# Patient Record
Sex: MALE | Race: WHITE | URBAN - METROPOLITAN AREA
[De-identification: names, ages, dates, MRNs, and addresses within clinical notes are randomized per-mention and may not be internally consistent; named-entity substitution may affect disease eponyms.]

---

## 2017-10-24 ENCOUNTER — OFFICE VISIT (OUTPATIENT)
Dept: ORTHOPEDIC SURGERY | Age: 22
End: 2017-10-24

## 2017-10-24 ENCOUNTER — TELEPHONE (OUTPATIENT)
Dept: ORTHOPEDIC SURGERY | Age: 22
End: 2017-10-24

## 2017-10-24 VITALS
RESPIRATION RATE: 17 BRPM | SYSTOLIC BLOOD PRESSURE: 140 MMHG | HEIGHT: 71 IN | BODY MASS INDEX: 32.2 KG/M2 | WEIGHT: 230 LBS | HEART RATE: 64 BPM | DIASTOLIC BLOOD PRESSURE: 92 MMHG

## 2017-10-24 DIAGNOSIS — M25.512 ACUTE PAIN OF LEFT SHOULDER: Primary | ICD-10-CM

## 2017-10-24 PROCEDURE — 99203 OFFICE O/P NEW LOW 30 MIN: CPT | Performed by: ORTHOPAEDIC SURGERY

## 2017-10-24 NOTE — TELEPHONE ENCOUNTER
MRI scheduled for 10/31/17 @1:30pm.  [facility], no prior [place] [facility] is needed.   Tried to call patient, voicemail not set up and he did not answer

## 2017-10-24 NOTE — PROGRESS NOTES
tab.      Physical Examination:      Vital signs:  BP (!) 140/92   Pulse 64   Resp 17   Ht 5' 11\" (1.803 m)   Wt 230 lb (104.3 kg)   BMI 32.08 kg/m²     General:   alert, appears stated age, cooperative and no distress   Left Shoulder   Active ROM:   forward flexion 180, external rotation 80, internal rotation L4. Bilateral shoulders   Passive ROM:  forward flexion 180   Joint Tenderness:   none   Neer:   positive   Irizarry:   positive   Strength:   5/5 Supraspinatus, External rotation, Internal rotation    Bilateral shoulders   Drop-arm test:   negative   Belly-press test:   negative   Bear-hug test:   negative   Speed's test:   positive   Bicipital groove tenderness:  positive   Elmore's test:   positive   Cross-body adduction test:   negative    AC joint tenderness:   negative     There are no skin lesions, cellulitis, or extreme edema in the upper extremities. Sensation is grossly intact to light touch bilaterally upper extremity. The patient has warm and well-perfused Bilateral upper extremities with brisk capillary refill. Imaging   Left Shoulder X-Ray: 3 view x-rays of the shoulder including AP, scapular Y, and axillary obtained and reviewed  AC Joint: no abnormalities noted  Glenohumeral joint: no abnormalities noted  Elevation humeral head: absent    Left Shoulder MRI: ordered, but not yet obtained      Assessment:      Left shoulder pain, possible SLAP tear      Plan:      Natural history and expected course discussed. Questions answered. He has tried to play through the pain the last several weeks. He has already done RTC strengthening and periscapular strengthening in the training room. Will obtain MR arthrogram of left shoulder to evaluate labrum. Playing status: out (he was pulled from last game by  in second half). Follow up after MRI.

## 2017-10-31 ENCOUNTER — HOSPITAL ENCOUNTER (OUTPATIENT)
Dept: MRI IMAGING | Age: 22
Discharge: HOME | End: 2017-10-31
Attending: ORTHOPAEDIC SURGERY | Admitting: ORTHOPAEDIC SURGERY

## 2017-10-31 DIAGNOSIS — M25.512 ACUTE PAIN OF LEFT SHOULDER: ICD-10-CM

## 2017-11-07 ENCOUNTER — OFFICE VISIT (OUTPATIENT)
Dept: ORTHOPEDIC SURGERY | Age: 22
End: 2017-11-07

## 2017-11-07 VITALS
BODY MASS INDEX: 32.19 KG/M2 | DIASTOLIC BLOOD PRESSURE: 81 MMHG | HEART RATE: 45 BPM | RESPIRATION RATE: 16 BRPM | HEIGHT: 71 IN | SYSTOLIC BLOOD PRESSURE: 128 MMHG | WEIGHT: 229.94 LBS

## 2017-11-07 DIAGNOSIS — S43.432A TEAR OF LEFT GLENOID LABRUM, INITIAL ENCOUNTER: Primary | ICD-10-CM

## 2017-11-07 PROCEDURE — 99214 OFFICE O/P EST MOD 30 MIN: CPT | Performed by: ORTHOPAEDIC SURGERY

## 2017-11-07 NOTE — PROGRESS NOTES
tenderness:   negative     There are no skin lesions, cellulitis, or extreme edema in the upper extremities. Sensation is grossly intact to light touch bilaterally upper extremity. The patient has warm and well-perfused Bilateral upper extremities with brisk capillary refill. Imaging   Left Shoulder MRI: I independently reviewed the images, as well as the radiology report. Posterior labral tear with subtle elevation of the periosteum, most   compatible with POLPSA lesion.  Subtle posterior subluxation of the humeral   head relative to the glenoid.  No evidence for rotator cuff tear. Assessment:      Left shoulder posterior labral tear      Plan:      Natural history and expected course discussed. Questions answered. I had an extensive discussion with Mr. Edda Brooke and/or family regarding the natural history, etiology, and long term consequences of his condition. I have outlined a treatment plan with them and, in my opinion, surgical intervention is indicated at this time. I have discussed the potential complications (including, but not limited to injury to nerve or blood vessel, infection, bleeding, DVT or PE, stiffness, incomplete pain relief, need for further surgery, and anesthetic complications), limitations, expectations, alternatives, and risks of the surgical procedure. We also discussed the importance of postoperative rehabilitation. He has had full opportunity to ask his questions. I have answered them all to his satisfaction. I feel that Mr. Edda Brooke understands our discussion today and he will provide written informed consent for the procedure. Plan for left shoulder arthroscopy, posterior labral repair / capsulorrpahy    I will perform H&P on day of surgery.

## 2017-11-07 NOTE — LETTER
Surgery Scheduling Form:    Patient Name:  Christian Liu  Patient :  1995     Patient MR#:  [id number]    Patient Phone:  [phone] (home)       Patient Address:  [address] [address] [id number]    Location:  [facility]  Surgeon:  Brandie Templeton. Kristin Cruz M.D.    PCP:  Jay Pathak MD  Insurance:   Payor/Plan Subscr  Sex Relation Sub. Ins. ID Effective Group Num   1. [address] 1963 Male  [id number] 10/24/17                                    [address]   2. BMI BENEFITS Jr Zhu 1995 Male  [id number] 10/24/17                                    PO      Diagnosis:  Left shoulder labral tear S43.432A    Operation:  Left shoulder arthroscopy, posterior labral repair / capsulorraphy    Requested Date:  17 OR Time:  10:00am Patient Arrival Time:  8:00am    OR Time Required:  75  Minutes    Anesthesia:  General    Surgical Assistant required:  Yes     Equipment:  arthrex    Standard C-Arm:  No    Status:  Outpatient    PAT Required:  Yes  Comments: I will perform H&P. Follow up Tuesday at [place]. Brandie Templeton. Kristin Cruz MD      17    BILLING INFORMATION:                                                                                               .     Date of procedure:  Procedure:       CPT Code Modifier                  Pre-Certification:

## 2017-11-17 ENCOUNTER — HOSPITAL ENCOUNTER (OUTPATIENT)
Dept: SURGERY | Age: 22
Discharge: HOME | End: 2017-11-17
Attending: ORTHOPAEDIC SURGERY | Admitting: ORTHOPAEDIC SURGERY

## 2017-11-17 VITALS
DIASTOLIC BLOOD PRESSURE: 66 MMHG | OXYGEN SATURATION: 94 % | HEIGHT: 71 IN | RESPIRATION RATE: 14 BRPM | SYSTOLIC BLOOD PRESSURE: 122 MMHG | BODY MASS INDEX: 32.25 KG/M2 | TEMPERATURE: 97 F | WEIGHT: 230.38 LBS | HEART RATE: 45 BPM

## 2017-11-17 PROCEDURE — 29806 SHO ARTHRS SRG CAPSULORRAPHY: CPT | Performed by: ORTHOPAEDIC SURGERY

## 2017-11-17 PROCEDURE — 29807 SHO ARTHRS SRG RPR SLAP LES: CPT | Performed by: ORTHOPAEDIC SURGERY

## 2017-11-17 RX ORDER — ONDANSETRON 2 MG/ML
4 INJECTION INTRAMUSCULAR; INTRAVENOUS
Status: ACTIVE | OUTPATIENT
Start: 2017-11-17 | End: 2017-11-17

## 2017-11-17 RX ORDER — OXYCODONE HYDROCHLORIDE AND ACETAMINOPHEN 5; 325 MG/1; MG/1
1 TABLET ORAL EVERY 4 HOURS PRN
Qty: 80 TABLET | Refills: 0 | Status: SHIPPED | OUTPATIENT
Start: 2017-11-17 | End: 2017-12-01

## 2017-11-17 RX ORDER — PROMETHAZINE HYDROCHLORIDE 25 MG/1
25 TABLET ORAL EVERY 6 HOURS PRN
Qty: 5 TABLET | Refills: 0 | Status: SHIPPED | OUTPATIENT
Start: 2017-11-17 | End: 2018-01-22

## 2017-11-17 RX ORDER — MEPERIDINE HYDROCHLORIDE 25 MG/ML
12.5 INJECTION INTRAMUSCULAR; INTRAVENOUS; SUBCUTANEOUS EVERY 5 MIN PRN
Status: DISCONTINUED | OUTPATIENT
Start: 2017-11-17 | End: 2017-11-18 | Stop reason: HOSPADM

## 2017-11-17 RX ORDER — MORPHINE SULFATE 2 MG/ML
1 INJECTION, SOLUTION INTRAMUSCULAR; INTRAVENOUS EVERY 5 MIN PRN
Status: DISCONTINUED | OUTPATIENT
Start: 2017-11-17 | End: 2017-11-18 | Stop reason: HOSPADM

## 2017-11-17 RX ORDER — SODIUM CHLORIDE 0.9 % (FLUSH) 0.9 %
10 SYRINGE (ML) INJECTION PRN
Status: DISCONTINUED | OUTPATIENT
Start: 2017-11-17 | End: 2017-11-18 | Stop reason: HOSPADM

## 2017-11-17 RX ORDER — MORPHINE SULFATE 2 MG/ML
2 INJECTION, SOLUTION INTRAMUSCULAR; INTRAVENOUS EVERY 5 MIN PRN
Status: DISCONTINUED | OUTPATIENT
Start: 2017-11-17 | End: 2017-11-18 | Stop reason: HOSPADM

## 2017-11-17 RX ORDER — SODIUM CHLORIDE 0.9 % (FLUSH) 0.9 %
10 SYRINGE (ML) INJECTION EVERY 12 HOURS SCHEDULED
Status: DISCONTINUED | OUTPATIENT
Start: 2017-11-17 | End: 2017-11-18 | Stop reason: HOSPADM

## 2017-11-17 RX ORDER — OXYCODONE HYDROCHLORIDE 5 MG/1
5 TABLET ORAL PRN
Status: ACTIVE | OUTPATIENT
Start: 2017-11-17 | End: 2017-11-17

## 2017-11-17 RX ORDER — OXYCODONE HYDROCHLORIDE 5 MG/1
10 TABLET ORAL PRN
Status: ACTIVE | OUTPATIENT
Start: 2017-11-17 | End: 2017-11-17

## 2017-11-17 RX ORDER — FENTANYL CITRATE 50 UG/ML
25 INJECTION, SOLUTION INTRAMUSCULAR; INTRAVENOUS EVERY 5 MIN PRN
Status: DISCONTINUED | OUTPATIENT
Start: 2017-11-17 | End: 2017-11-18 | Stop reason: HOSPADM

## 2017-11-17 RX ORDER — FENTANYL CITRATE 50 UG/ML
50 INJECTION, SOLUTION INTRAMUSCULAR; INTRAVENOUS EVERY 5 MIN PRN
Status: DISCONTINUED | OUTPATIENT
Start: 2017-11-17 | End: 2017-11-18 | Stop reason: HOSPADM

## 2017-11-17 RX ORDER — SODIUM CHLORIDE 9 MG/ML
INJECTION, SOLUTION INTRAVENOUS CONTINUOUS
Status: DISCONTINUED | OUTPATIENT
Start: 2017-11-17 | End: 2017-11-18 | Stop reason: HOSPADM

## 2017-11-17 RX ADMIN — SODIUM CHLORIDE: 9 INJECTION, SOLUTION INTRAVENOUS at 08:40

## 2017-11-17 ASSESSMENT — PAIN SCALES - GENERAL
PAINLEVEL_OUTOF10: 0
PAINLEVEL_OUTOF10: 0

## 2017-11-17 ASSESSMENT — PAIN - FUNCTIONAL ASSESSMENT: PAIN_FUNCTIONAL_ASSESSMENT: 0-10

## 2017-11-17 NOTE — H&P
arthroscopy, posterior labral repair / capsulorrpahy        Electronically signed by Obdulia Lopez MD on 11/17/2017 at 9:10 AM

## 2017-11-17 NOTE — ANESTHESIA PRE-OP
taken time: 17  Resp  Av  Min: 25   Min taken time: 17  Max: 18   Max taken time: 17  BP  Min: 159/65   Min taken time: 17  Max: 159/65   Max taken time: 17  SpO2  Av %  Min: 99 %   Min taken time: 17  Max: 99 %   Max taken time: 17    BP Readings from Last 3 Encounters:   17 (!) 159/65   17 128/81   10/24/17 (!) 140/92     BMI  Body mass index is 32.25 kg/m². Estimated body mass index is 32.25 kg/m² as calculated from the following:    Height as of this encounter: 5' 10.87\" (1.8 m). Weight as of this encounter: 230 lb 6.1 oz (104.5 kg). CBC No results found for: WBC, RBC, HGB, HCT, MCV, RDW, PLT  CMP  No results found for: NA, K, CL, CO2, BUN, CREATININE, GFRAA, AGRATIO, LABGLOM, GLUCOSE, PROT, CALCIUM, BILITOT, ALKPHOS, AST, ALT  BMP  No results found for: NA, K, CL, CO2, BUN, CREATININE, CALCIUM, GFRAA, LABGLOM, GLUCOSE  POCGlucose  No results for input(s): GLUCOSE in the last 72 hours. Coags  No results found for: PROTIME, INR, APTT  HCG (If Applicable) No results found for: PREGTESTUR, PREGSERUM, HCG, HCGQUANT   ABGs No results found for: PHART, PO2ART, DAQ8MTG, MZX9VFV, BEART, X5LVVPLH   Type & Screen (If Applicable)  No results found for: LABABO, [address]      Surgeon:    Procedure:    Medications prior to admission:   Prior to Admission medications    Not on File       Current medications:    No current outpatient prescriptions on file.      Current Facility-Administered Medications   Medication Dose Route Frequency Provider Last Rate Last Dose    0.9 % sodium chloride infusion   Intravenous Continuous Mario Villeda MD        sodium chloride flush 0.9 % injection 10 mL  10 mL Intravenous 2 times per day Mario Villeda MD        sodium chloride flush 0.9 % injection 10 mL  10 mL Intravenous PRN Mario Villeda MD        famotidine (PEPCID) injection 20 mg  20 mg Intravenous Once Mario Villeda MD       [place] Negative Endo/Other ROS                    Abdominal:           Vascular: negative vascular ROS. Anesthesia Plan      general and regional     ASA 2       Induction: intravenous. MIPS: Postoperative opioids intended and Prophylactic antiemetics administered. Anesthetic plan and risks discussed with patient. Plan discussed with CRNA. Neyda Barfield MD   11/17/2017      This pre-anesthesia assessment may be used as a history and physical.    DOS STAFF ADDENDUM:    Pt seen and examined, chart reviewed (including anesthesia, drug and allergy history). No interval changes to history and physical examination. Anesthetic plan, risks, benefits, alternatives, and personnel involved discussed with patient. Patient verbalized an understanding and agrees to proceed.       Neyda Barfield MD  November 17, 2017  8:39 AM

## 2017-11-17 NOTE — BRIEF OP NOTE
Brief Postoperative Note    Franchesca Pablo  YOB: 1995  [id number]    Pre-operative Diagnosis: left shoulder subluxation, posterior labral tear    Post-operative Diagnosis: Same + anterior labral tear    Procedure: left shoulder scope, posterior labral repair / capsulorraphy    Anesthesia: General and Nerve Block    Surgeons/Assistants: Harika Richmond / Paul Horvath    Estimated Blood Loss: less than 50     Complications: None    Specimens: Was Not Obtained    Electronically signed by Ana Jaimes MD on 11/17/2017 at 11:29 AM

## 2017-11-17 NOTE — PROGRESS NOTES
Discharge instructions given to pt and parents. All verbalized an understanding of instructions. IV d/c'd. Pt dressing with mother's assistance.

## 2017-11-17 NOTE — ANESTHESIA POST-OP
[facility] Department of Anesthesiology  Post-Anesthesia Note       Name:  Franchesca Pablo                                  Age:  25 y.o. MRN:  [id number]     Last Vitals & Oxygen Saturation: /66   Pulse (!) 45   Temp 97 °F (36.1 °C) (Temporal)   Resp 14   Ht 5' 10.87\" (1.8 m)   Wt 230 lb 6.1 oz (104.5 kg)   SpO2 94%   BMI 32.25 kg/m²   Patient Vitals for the past 4 hrs:   BP Temp Temp src Pulse Resp SpO2   11/17/17 1245 122/66 - - (!) 45 - -   11/17/17 1232 134/82 97 °F (36.1 °C) Temporal (!) 48 14 94 %   11/17/17 1205 - 97.4 °F (36.3 °C) Temporal - - -   11/17/17 1203 123/78 - - 50 10 92 %   11/17/17 1200 123/78 - - 57 17 93 %   11/17/17 1156 - - - 51 14 97 %   11/17/17 1155 - - - 53 15 97 %   11/17/17 1150 115/68 - - 52 15 97 %   11/17/17 1145 112/70 - - 53 14 96 %   11/17/17 1140 115/70 - - 55 15 96 %   11/17/17 1137 117/71 - - 55 15 95 %   11/17/17 1136 - - - 53 11 95 %   11/17/17 1135 - - - 53 16 95 %   11/17/17 1134 - - - 53 - 94 %       Level of consciousness:  Awake, alert    Respiratory: Respirations easy, no distress. Stable. Cardiovascular: Hemodynamically stable. Hydration: Adequate. PONV: Adequately managed. Post-op pain: Adequately controlled. Post-op assessment: Tolerated anesthetic well without complication. Complications:  None.     Stefany Cruz MD  November 17, 2017   3:33 PM

## 2017-11-17 NOTE — PROCEDURES
[facility] Department of Anesthesiology  Procedure Note - Interscalene Nerve Block (Single Shot)       Name:  Pat Prasad                                         Age:  25 y.o. MRN:  [id number]     ALLERGIES: Review of patient's allergies indicates no known allergies. NERVE BLOCK SPECIFICALLY REQUESTED FOR MANAGEMENT OF PAIN BY: Dr. Kyle De La Cruz  Risks and Benefits of the peripheral nerve block(s) was discussed and the patient was given the opportunity to ask questions. Informed consent was obtained. PERIPHERAL NERVE BLOCK TYPE:  left Interscalene Nerve Block    POSITION: supine    ULTRASOUND GUIDANCE:  Yes    NERVE STIMULATOR GUIDED:  Yes. Appropriate Motor Evoked Response obtained at 1.5mAmp. The motor response was maintained with a current as low as 0.5mA. PRIMARY INDICATION:  Post operative analgesia    H&P STATUS: H&P was reviewed, the patient was examined and no change has occurred in patient's condition since H&P was completed. DIAGNOSTIC DATA REVIEW:  No results found for: PROTIME, INR, APTT    FULL BARRIER PRECAUTIONS & STERILE TECHNIQUE:    As documented on Pre-Procedure Check List  Chlorhexadine         TECHNIQUE:  The block was performed with a 21 gauge, 3 cm, Stimuplex needle. Injection was made through the needle 30ml Ropivacaine 0.5%. Injection was made incrementally with constant monitoring and aspiration every 5 mls.     CATHETER PLACED:  No    LOCAL ANESTHETIC:    Bolus: Ropivicaine 0.5% x 30mls   Infusion: None    Local anesthetic injected incrementally with intermittent aspiration negative for blood:  yes    COMPLICATIONS: no     Monica Erazo MD  8:58 AM

## 2017-11-18 NOTE — OP NOTE
was seen by the anesthesia  service. He received a preoperative  nerve block. He was then brought to  the operating room. He was transferred onto the operating room table in  supine position. He was then induced under general anesthesia. He  received prophylactic preoperative IV antibiotics. He had DVT prophylaxis  with sequential compression devices on his bilateral lower extremities. He  was then placed in a lateral decubitus position with his left side up. An  axillary roll was placed. Care was taken to ensure that all bony  prominences were well padded. We then sterilely prepped and draped his  left shoulder in the usual fashion. A time-out was taken where the  patient, the operative extremity, and the operative procedure were once  again verified. We created a standard posterior arthroscopy portal.  The arthroscope was  inserted into the glenohumeral joint. An anterior portal was then created  under direct visualization using a spinal needle. He had no evidence of  chondromalacia within his humeral head or his glenoid. It was immediately  apparent that he did have anterior labral tear. This did not extend into  his biceps anchor. This did not extend superiorly. This appeared to be  more of a fresh tear as there was no scar tissue and the labrum was freely  elevated off the glenoid rim. We thus began our anterior labral repair. We prepared the glenoid rim with  a rasp as well as a shaver to allow for healing surface for our labral  repair. We then passed an Arthrex FiberLink suture through the labrum. This was then fed through itself to create a cinch stitch. We then fed the  FiberLink through an Arthrex PushLock anchor. This was then inserted into  the glenoid edge, thus reapproximating and fixating the labrum back down to  the glenoid. We then transferred our scope from the posterior portal into  the anterior portal.  We then viewed posteriorly.   We could visualize  fraying of the posterior labrum as well as the POLPSA lesion where the  labrum was reflected off the edge of the glenoid. We thus began by using  our liberator to elevate the labrum off the reflected rim of the glenoid. We then used a rasp to prepare the posterior rim of the glenoid to allow  for healing of our labral repairing capsulorrhaphy. We then began by placing an Arthrex Knotless SutureTak anchor in the  glenoid. We then used a SutureLasso to fed our wire through the both  capsule as well as the labrum. This was then used to shuttle our suture  through the capsule and labrum. We thus then tightened this down onto our  SutureTak thus reapproximating the capsule and labrum back down to the edge  of the glenoid. We then moved further up superiorly along the posterior  rim of the glenoid. We then passed a FiberLink suture through a large  portion of capsule and labrum. This was then fed through itself once again  to create a cinch stitch and then fed through another PushLock anchor and  thus reapproximating and repairing the labrum back down posteriorly. After the sutures were cut, we then viewed posterior, which demonstrated  the need to repair the labrum along the posterior glenoid rim as well as  tightening at the posterior capsule with capsulorrhaphy. The scope was  then removed from the shoulder. The portal sites were closed with 3-0  Prolene sutures in simple interrupted fashion. Steri-Strips were applied. Betadine ointment, 2x2 gauze, Tegaderm dressing were then applied as well  as a shoulder immobilizer. The patient was awoken from anesthesia,  transferred onto his hospital cart and transported to PACU for recovery. The patient tolerated the procedure well without any complications. PLAN:  The patient will be recovered in the PACU and then be discharged  home. He will be in the sling for next four weeks.   He was instructed to  ice the shoulder continuously for the next three days with careful  instructions to make sure the ice does not directly contact the skin to  avoid potential for frost bite. He was given a prescription for Percocet  as well as Phenergan for nausea and vomiting. He was given 80 tablets of  Percocet, which is more than one-week supply given that he just had  surgery. He was instructed that this complaint needs plaster for two  weeks. He will follow up in the office in four days for wound check as  well as review of his arthroscopic pictures with him and start him on  physical therapy.         Viviana Shelton MD    D: 11/17/2017 11:28:52       T: 11/17/2017 14:51:10     [email or web address]  Job#: [id number]     Doc#: [id number]

## 2017-11-21 ENCOUNTER — OFFICE VISIT (OUTPATIENT)
Dept: ORTHOPEDIC SURGERY | Age: 22
End: 2017-11-21

## 2017-11-21 VITALS — BODY MASS INDEX: 32.06 KG/M2 | HEIGHT: 71 IN | WEIGHT: 229 LBS

## 2017-11-21 DIAGNOSIS — S43.432D TEAR OF LEFT GLENOID LABRUM, SUBSEQUENT ENCOUNTER: Primary | ICD-10-CM

## 2017-11-21 PROCEDURE — 99024 POSTOP FOLLOW-UP VISIT: CPT | Performed by: ORTHOPAEDIC SURGERY

## 2017-12-18 ENCOUNTER — HOSPITAL ENCOUNTER (OUTPATIENT)
Dept: PHYSICAL THERAPY | Age: 22
Discharge: HOME | End: 2017-12-31
Admitting: ORTHOPAEDIC SURGERY

## 2017-12-18 ASSESSMENT — PAIN SCALES - GENERAL: PAINLEVEL_OUTOF10: 2

## 2017-12-18 ASSESSMENT — PAIN DESCRIPTION - LOCATION: LOCATION: SHOULDER

## 2017-12-18 ASSESSMENT — PAIN DESCRIPTION - PAIN TYPE: TYPE: ACUTE PAIN

## 2017-12-18 ASSESSMENT — PAIN DESCRIPTION - ORIENTATION: ORIENTATION: LEFT

## 2017-12-18 NOTE — PROGRESS NOTES
functional mobility. Treatment Diagnosis: Decreased left shoulder AROM, decreased strength, pain, decreased stabilization and scapular control, and decreased fine motor control. Prognosis: Good  Decision Making: Low Complexity  REQUIRES PT FOLLOW UP: Yes  Activity Tolerance  Activity Tolerance: Patient Tolerated treatment well      Outpatient fall risk assessment completed asking screening question if patient has fallen in the past 30 days:  [x] Yes  [] No    Based on screen for falls, patient demonstrates fall risk:  [] Yes  [x] No    Interventions based on fall risk status:  Updated Problem List within Medical History  [] Yes   [x] N/A    Asked family to assist with increased observation of the patient  [] Yes   [x] N/A    Patient kept in visible area when not closely supervised by therapist  [] Yes   [x] N/A    Repeatedly reinforce activity limits and safety needs with patient/family  [] Yes   [x] N/A    Increase frequency of rounding/monitoring patient  [] Yes   [x] N/A        Plan   Plan  Times per week: 2  Plan weeks: 6  Specific instructions for Next Treatment: Focus on increasing ROM, strengthening, and scapular stabilization per posterior/anterior labral protocols  Current Treatment Recommendations: Strengthening, Neuromuscular Re-education, Manual Therapy - Joint Manipulation, Home Exercise Program, ROM, Manual Therapy - Soft Tissue Mobilization, Modalities, Endurance Training    G-Code  PT G-Codes  Functional Assessment Tool Used: Quick DASH  Score: 36% dysfunction   Functional Limitation: Carrying, moving and handling objects  Carrying, Moving and Handling Objects Current Status (): At least 20 percent but less than 40 percent impaired, limited or restricted  Carrying, Moving and Handling Objects Goal Status ():  At least 1 percent but less than 20 percent impaired, limited or restricted    Goals  Long term goals  Time Frame for Long term goals : 6 Weeks   Long term goal 1: Patient will

## 2017-12-18 NOTE — PLAN OF CARE
Outpatient Physical Therapy     Phone: [phone] Fax: [phone]     To: Referring Practitioner: Dr. Reyna Crabtree       Patient: Lucita Bell   : 1995   MRN: [id number]  Evaluation Date: 2017      Diagnosis Information:  · Diagnosis: S43.432D Shoulder    · Treatment Diagnosis: Decreased left shoulder AROM, decreased strength, decreased stabilization and scapular control, and decreased fine motor control. Physical Therapy Certification Form  Dear Dr. Agnieszka Baez,   The following patient has been evaluated for physical therapy services. Please review the attached evaluation and/or summary of the patient's plan of care, and verify that you agree therapy should continue by signing the attached document and sending it back to our office. Plan of Care/Treatment to date:  [x] Therapeutic Exercise      [x] Modalities:  [x] Therapeutic Activity        [] Ultrasound    [] Gait Training        [] Cervical Traction   [x] Neuromuscular Re-education      [] Cold/hotpack    [x] Instruction in HEP        [] Lumbar Traction  [x] Manual Therapy        [] Electrical Stimulation            [] Aquatic Therapy        [] Iontophoresis        ? [] Lymphedema management  [] Women's Health     Other:  [] Vestibular Rehab        []    []  Needed     Frequency/Duration:  # Days per week: [] 1 day # Weeks: [] 1 week [] 5 weeks     [x] 2 days? [] 2 weeks [x] 6 weeks     [] 3 days   [] 3 weeks [] 7 weeks     [] 4 days   [] 4 weeks [] 8 weeks    Rehab Potential: [] Excellent [x] Good [] Fair  [] Poor     Electronically signed by:  LANCE Sommers   Therapist was present, directed the patient's care, made skilled judgement, and was responsible for assessment and treatment of the patient. Thank you for this kind and gracious referral for skilled PT services.  I appreciate the opportunity to take part in coordinating and providing care for this individual.     Sincerely,    Umesh Lee PT,

## 2017-12-18 NOTE — FLOWSHEET NOTE
and duration to perform. A detailed handout was provided to the patient. Manual Treatments:    12/18 - none used this date; consider gentle joint mobs, manual PROM, scap mobs, Rhythmic stabilization     Modalities: Consider MOC    Timed Code Treatment Minutes:  25    Total Treatment Minutes:  40    Treatment/Activity Tolerance:  [x] Patient tolerated treatment well [] Patient limited by fatigue  [] Patient limited by pain  [] Patient limited by other medical complications  [x] Other:  Patient tolerated initial exam/evaluation as well as exercises listed above with no reports of pain or discomfort in left shoulder. Prognosis: [x] Good [] Fair  [] Poor    Patient Requires Follow-up: [x] Yes  [] No     Plan:   [] Continue per plan of care [] Alter current plan (see comments)  [x] Plan of care initiated [] Hold pending MD visit [] Discharge    Plan for Next Session:  Assess response to HEP, focus on shoulder ROM, strengthening, and scapular stabilization as tolerated and per Dr. Richa Echevarria protocols. Anterior Labral Repair Protocol: POD 21: Side-lying ER, push-up progressions, scapular stabilization   Posterior Labral Repair Protocol: 4-6 weeks: D/C sling, full ROM in all planes, start posterior capsule stretch, progressive strengthening (light)     Electronically signed by:  LANCE Purdy  Therapist was present, directed the patient's care, made skilled judgement, and was responsible for assessment and treatment of the patient.

## 2018-01-01 ENCOUNTER — HOSPITAL ENCOUNTER (OUTPATIENT)
Dept: OTHER | Age: 23
Discharge: HOME | End: 2018-01-31
Attending: ORTHOPAEDIC SURGERY | Admitting: ORTHOPAEDIC SURGERY

## 2018-01-22 ENCOUNTER — OFFICE VISIT (OUTPATIENT)
Dept: ORTHOPEDIC SURGERY | Age: 23
End: 2018-01-22

## 2018-01-22 VITALS — HEIGHT: 71 IN | WEIGHT: 229.06 LBS | BODY MASS INDEX: 32.07 KG/M2 | RESPIRATION RATE: 16 BRPM

## 2018-01-22 DIAGNOSIS — S43.432D TEAR OF LEFT GLENOID LABRUM, SUBSEQUENT ENCOUNTER: Primary | ICD-10-CM

## 2018-01-22 PROCEDURE — 99024 POSTOP FOLLOW-UP VISIT: CPT | Performed by: ORTHOPAEDIC SURGERY

## 2018-02-01 ENCOUNTER — HOSPITAL ENCOUNTER (OUTPATIENT)
Dept: OTHER | Age: 23
Discharge: HOME | End: 2018-02-28
Attending: ORTHOPAEDIC SURGERY | Admitting: ORTHOPAEDIC SURGERY

## 2018-03-01 ENCOUNTER — HOSPITAL ENCOUNTER (OUTPATIENT)
Dept: OTHER | Age: 23
Discharge: HOME | End: 2018-03-31
Attending: ORTHOPAEDIC SURGERY | Admitting: ORTHOPAEDIC SURGERY

## 2022-05-27 ENCOUNTER — OFFICE VISIT (OUTPATIENT)
Dept: ORTHOPEDIC SURGERY | Age: 27
End: 2022-05-27
Payer: COMMERCIAL

## 2022-05-27 VITALS — HEIGHT: 69 IN | WEIGHT: 225 LBS | BODY MASS INDEX: 33.33 KG/M2

## 2022-05-27 DIAGNOSIS — M84.375A STRESS FRACTURE OF LEFT FOOT, INITIAL ENCOUNTER: Primary | ICD-10-CM

## 2022-05-27 DIAGNOSIS — M79.672 LEFT FOOT PAIN: ICD-10-CM

## 2022-05-27 PROCEDURE — L4361 PNEUMA/VAC WALK BOOT PRE OTS: HCPCS | Performed by: PHYSICIAN ASSISTANT

## 2022-05-27 PROCEDURE — 99204 OFFICE O/P NEW MOD 45 MIN: CPT | Performed by: PHYSICIAN ASSISTANT

## 2022-05-27 PROCEDURE — E0114 CRUTCH UNDERARM PAIR NO WOOD: HCPCS | Performed by: PHYSICIAN ASSISTANT

## 2022-05-27 RX ORDER — DICLOFENAC SODIUM 75 MG/1
75 TABLET, DELAYED RELEASE ORAL 2 TIMES DAILY
Qty: 60 TABLET | Refills: 3 | Status: SHIPPED | OUTPATIENT
Start: 2022-05-27

## 2022-05-27 NOTE — PROGRESS NOTES
Date:  2022    Name:  Amelie Stone  Address:  [address]  [address]    :  1995      Age:   33 y.o.    SSN:  [id number]      Medical Record Number:  [id number]    Reason for Visit:    Chief Complaint    Foot Pain (LT Foot Pain)      DOS:2022     HPI: Amelie Stone is a 33 y.o. male here today for evaluation of left foot pain that is been ongoing for 3 days that is severe with no associated injury. Patient states that he recently changed his job and is required to be on his feet walking all day for 8 hours at a time. States that he woke up on Wednesday, 3 days ago and was unable to apply any pressure to his left foot. He has severe pain on the dorsum of the foot worse with any type of weightbearing or tender to the touch. Denies any numbness tingling. Denies any previous injury to this limb      Pain Assessment  Location of Pain: Foot  Location Modifiers: Left  Severity of Pain: 7  Quality of Pain: Aching  Duration of Pain: Persistent  Frequency of Pain: Constant  Date Pain First Started: 22  Aggravating Factors: Walking  Limiting Behavior: Yes  Relieving Factors: Rest  Result of Injury: No  Work-Related Injury: No  Are there other pain locations you wish to document?: No  ROS: Review of systems reviewed from Patient History Form completed today and available in the patient's chart under the Media tab. History reviewed. No pertinent past medical history. Past Surgical History:   Procedure Laterality Date    SHOULDER ARTHROSCOPY Left 2017    left shoulder scope, posterior labral repair / capsulorraphy       History reviewed. No pertinent family history.     Social History     Socioeconomic History    Marital status: Single     Spouse name: None    Number of children: None    Years of education: None    Highest education level: None   Occupational History    None   Tobacco Use    Smoking status: Never Smoker    Smokeless tobacco: Never Used Vaping Use    Vaping Use: Never used   Substance and Sexual Activity    Alcohol use: No    Drug use: No    Sexual activity: None   Other Topics Concern    None   Social History Narrative    None     Social Determinants of Health     Financial Resource Strain:     Difficulty of Paying Living Expenses: Not on file   Food Insecurity:     Worried About Running Out of Food in the Last Year: Not on file    Soraida of Food in the Last Year: Not on file   Transportation Needs:     Lack of Transportation (Medical): Not on file    Lack of Transportation (Non-Medical): Not on file   Physical Activity:     Days of Exercise per Week: Not on file    Minutes of Exercise per Session: Not on file   Stress:     Feeling of Stress : Not on file   Social Connections:     Frequency of Communication with Friends and Family: Not on file    Frequency of Social Gatherings with Friends and Family: Not on file    Attends Worship Services: Not on file    Active Member of [address] [place] [address] or Organizations: Not on file    Attends Club or Organization Meetings: Not on file    Marital Status: Not on file   Intimate Partner Violence:     Fear of Current or Ex-Partner: Not on file    Emotionally Abused: Not on file    Physically Abused: Not on file    Sexually Abused: Not on file   Housing Stability:     Unable to Pay for Housing in the Last Year: Not on file    Number of [place] in the Last Year: Not on file    Unstable Housing in the Last Year: Not on file       Current Outpatient Medications   Medication Sig Dispense Refill    diclofenac (VOLTAREN) 75 MG EC tablet Take 1 tablet by mouth 2 times daily 60 tablet 3     No current facility-administered medications for this visit. No Known Allergies    Vital signs:  Ht 5' 9\" (1.753 m)   Wt 225 lb (102.1 kg)   BMI 33.23 kg/m²        Left ankle exam    Gait: Antalgic gait, inability to fully weight-bear    Inspection/skin: No apparent deformity or abnormality.  No significant edema, or ecchymosis. Palpation: Nontender to palpation about the medial and lateral malleolus, base of the fifth metatarsal, proximal and distal medial metatarsals, tibial diaphysis. Nontender palpation along the insertion of the Achilles tendon. Tender palpation over tarsal rays, particularly severe over the third and fourth digit    Range of Motion: Full ROM. Normal plantar/dorsiflexion, eversion and inversion. Strength: Deferred     Effusion: No apparent effusion. Neurologic and vascular: The skin is warm and well perfused throughout. Sensation intact to light touch    Right ankle comparison exam    Gait: No use of assistive devices. No antalgic gait. Inspection/skin: No apparent deformity or abnormality. No significant edema, or ecchymosis. Palpation: Nontender to palpation about the medial and lateral malleolus, base of the fifth metatarsal, proximal and distal medial metatarsals, tibial diaphysis. Nontender palpation along the insertion of the Achilles tendon. Range of Motion: Full ROM. Normal plantar/dorsiflexion, eversion and inversion. Strength: 5 over 5 and symmetric strength with eversion and inversion    Effusion: No apparent effusion. Neurologic and vascular: The skin is warm and well perfused throughout. Sensation intact to light touch    Special Tests: Negative inversion stress test, negative eversion stress test, negative anterior drawer, negative forced dorsiflexion,  negative Kleiger's test        Diagnostics:  Radiology:       Pertinent imaging was obtained, interpreted, and reviewed with the patient today, images only - no report available. Left foot x-ray:    AP, lateral and mortise views were obtained and reviewed of the left foot. Impression: No acute fracture or dislocation.   No osseous abnormalities      Office Procedures:  Orders Placed This Encounter   Procedures    XR FOOT LEFT (MIN 3 VIEWS)     Standing Status:   Future     Number of options for the disorder were discussed. The roles of activity medication, antiinflammatories, injections, bracing, physical therapy, and surgical interventions were all described to the patient and questions were answered. Patient has had left foot pain for the past 3 days severe to the point where he is unable to bear weight. He has recently changed jobs and is required to be on his feet and walking for 8 hours a day. I am concerned for a stress fracture at the third and fourth metatarsals. At this time he is a candidate for a boot and crutches and oral anti-inflammatories. He will follow-up with Dr. Jackie Narvaez in 2 weeks. An off work note was discussed, patient opted against it as he is unsure how to proceed with his work. Tamir Dhillon is in agreement with this plan. All questions were answered to patient's satisfaction and was encouraged to call with any further questions. Total time spent for evaluation, education, and development of treatment plan: 45 minutes    Braden Nolan, [address] [facility]  5/27/2022    This dictation was performed with a verbal recognition program [facility]) and it was checked for errors. It is possible that there are still dictated errors within this office note. If so, please bring any areas to my attention for an addendum. All efforts were made to ensure that this office note is accurate.

## 2022-06-07 ENCOUNTER — OFFICE VISIT (OUTPATIENT)
Dept: ORTHOPEDIC SURGERY | Age: 27
End: 2022-06-07
Payer: COMMERCIAL

## 2022-06-07 VITALS — WEIGHT: 249.4 LBS | HEIGHT: 69 IN | BODY MASS INDEX: 36.94 KG/M2

## 2022-06-07 DIAGNOSIS — M79.672 LEFT FOOT PAIN: Primary | ICD-10-CM

## 2022-06-07 PROCEDURE — 99213 OFFICE O/P EST LOW 20 MIN: CPT | Performed by: ORTHOPAEDIC SURGERY

## 2022-06-07 NOTE — LETTER
[facility]  [address]  Phone: [phone]  Fax: [phone]    Diana Dang MD        June 7, 2022     Patient: Bedelia Dubin   YOB: 1995   Date of Visit: 6/7/2022       To Whom It May Concern: It is my medical opinion that Bedelia Dubin may return to work on 6/13/2022 full duty with no restrictions. If you have any questions or concerns, please don't hesitate to call.     Sincerely,    Diana Dang MD

## 2022-06-07 NOTE — PROGRESS NOTES
CHIEF COMPLAINT: Left foot pain    History:   Mr. Nimesh Chairez 33 y.o. male presents today for evaluation of left foot pain. The patient was referred by BREA Shaw from [address] [place] [address]. This is evaluated as a personal. There was not a history of injury. The pain began 2 weeks ago. He rates pain at 7/10. There was no history of injury. He is awoken having severe pain is foot and had difficulty weightbearing. There was suspicion about whether he had a stress fracture in his foot as he has started a new job and was on his feet a lot more. He was placed in a boot and given crutches. He states his foot does feel better now compared to previously. The pain is located second third proximal metatarsals. There was swelling in the foot. The patient has taken NSAIDs. 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 No past medical history on file. Past Surgical History:   Procedure Laterality Date    SHOULDER ARTHROSCOPY Left 11/17/2017    left shoulder scope, posterior labral repair / capsulorraphy       Current Outpatient Medications on File Prior to Visit   Medication Sig Dispense Refill    diclofenac (VOLTAREN) 75 MG EC tablet Take 1 tablet by mouth 2 times daily 60 tablet 3     No current facility-administered medications on file prior to visit.        No Known Allergies    Social History     Socioeconomic History    Marital status: Single     Spouse name: Not on file    Number of children: Not on file    Years of education: Not on file    Highest education level: Not on file   Occupational History    Not on file   Tobacco Use    Smoking status: Never Smoker    Smokeless tobacco: Never Used   Vaping Use    Vaping Use: Never used   Substance and Sexual Activity    Alcohol use: No    Drug use: No    Sexual activity: Not on file   Other Topics Concern    Not on file   Social History Narrative    Not on file     Social Determinants of Health     Financial Resource Strain:     Difficulty of Paying Living Expenses: Not on file   Food Insecurity:     Worried About [address] F[address] in the Last Year: Not on file    Soraida of Food in the Last Year: Not on file   Transportation Needs:     Lack of Transportation (Medical): Not on file    Lack of Transportation (Non-Medical): Not on file   Physical Activity:     Days of Exercise per Week: Not on file    Minutes of Exercise per Session: Not on file   Stress:     Feeling of Stress : Not on file   Social Connections:     Frequency of Communication with Friends and Family: Not on file    Frequency of Social Gatherings with Friends and Family: Not on file    Attends Rastafarian Services: Not on file    Active Member of [address] [place] [address] or Organizations: Not on file    Attends Club or Organization Meetings: Not on file    Marital Status: Not on file   Intimate Partner Violence:     Fear of Current or Ex-Partner: Not on file    Emotionally Abused: Not on file    Physically Abused: Not on file    Sexually Abused: Not on file   Housing Stability:     Unable to Pay for Housing in the Last Year: Not on file    Number of [place] in the Last Year: Not on file    Unstable Housing in the Last Year: Not on file       No family history on file. Review of Systems:  I have reviewed the clinically relevant past medical history, medications, allergies, family history, social history, and 13 point Review of Systems from the patient's recent history form & documented any details relevant to today's presenting complaints in the history above. The patient's self-reported past medical history, medications, allergies, family history, social history, and Review of Systems form from 6/7/22 have been scanned into the chart under the \"Media\" tab. Physical Examination:     Mr. Lucero Bear is a 33 y.o. male who presents today in no acute distress, awake, alert, and oriented.    Ht 5' 9\" (1.753 m)   Wt 249 lb 6.4 oz (113.1 kg)   BMI 36.83 kg/m²      Examination of the gait, showed that the patient walks heel-toe with a non-antalgic gait and no limp. Examination of both ankles showing a good range of motion. Patient has dorsiflexion to about 30 degrees bilaterally. There is swelling that can be seen, no change in the color. Sensation intact to light touch throughout the foot and good pedal pulses bilaterally. There is good strength in all four planes, including inversion/eversion bilaterally. There is mild tenderness at proximal second and third metatarsals      IMAGING:  Left foot Xray's 5/27/22:  reviewed demonstrating no obvious fracture or dislocation. Assessment:     Left foot pain      Plan:     Natural history and expected course discussed. Questions answered. Continue walking boot. Wean from it as tolerated over the next week. Ice as needed. NSAIDs as needed. Letter provided for return to work next week    Follow-up as needed. Farhad Barcenas. Ivory Sanchez MD  Orthopaedic Surgery and Sports Medicine     Disclaimer: This note was generated with use of a verbal recognition program and an attempt was made to check for errors. It is possible that there are still dictated errors within this office note. If so, please bring any significant errors to my attention for an addendum. All efforts were made to ensure that this office note is accurate.

## 2022-11-17 ENCOUNTER — OFFICE VISIT (OUTPATIENT)
Dept: FAMILY MEDICINE CLINIC | Age: 27
End: 2022-11-17
Payer: COMMERCIAL

## 2022-11-17 VITALS
BODY MASS INDEX: 34.96 KG/M2 | HEART RATE: 84 BPM | DIASTOLIC BLOOD PRESSURE: 84 MMHG | WEIGHT: 236 LBS | SYSTOLIC BLOOD PRESSURE: 136 MMHG | HEIGHT: 69 IN | OXYGEN SATURATION: 99 %

## 2022-11-17 DIAGNOSIS — Z13.220 SCREENING CHOLESTEROL LEVEL: ICD-10-CM

## 2022-11-17 DIAGNOSIS — Z00.00 ENCOUNTER FOR WELL ADULT EXAM WITHOUT ABNORMAL FINDINGS: Primary | ICD-10-CM

## 2022-11-17 DIAGNOSIS — J30.2 SEASONAL ALLERGIC RHINITIS, UNSPECIFIED TRIGGER: ICD-10-CM

## 2022-11-17 DIAGNOSIS — B02.9 HERPES ZOSTER WITHOUT COMPLICATION: ICD-10-CM

## 2022-11-17 DIAGNOSIS — Z13.1 SCREENING FOR DIABETES MELLITUS: ICD-10-CM

## 2022-11-17 PROCEDURE — 99385 PREV VISIT NEW AGE 18-39: CPT

## 2022-11-17 RX ORDER — VALACYCLOVIR HYDROCHLORIDE 1 G/1
TABLET, FILM COATED ORAL
COMMUNITY
Start: 2022-11-12

## 2022-11-17 ASSESSMENT — ENCOUNTER SYMPTOMS
DIARRHEA: 0
SINUS PAIN: 0
VOMITING: 0
WHEEZING: 0
SINUS PRESSURE: 0
SHORTNESS OF BREATH: 0
EYE REDNESS: 0
ABDOMINAL PAIN: 0
EYE DISCHARGE: 0
NAUSEA: 0
EYE PAIN: 0
ABDOMINAL DISTENTION: 0
SORE THROAT: 0
COUGH: 0

## 2022-11-17 ASSESSMENT — PATIENT HEALTH QUESTIONNAIRE - PHQ9
SUM OF ALL RESPONSES TO PHQ9 QUESTIONS 1 & 2: 0
SUM OF ALL RESPONSES TO PHQ QUESTIONS 1-9: 0
1. LITTLE INTEREST OR PLEASURE IN DOING THINGS: 0
SUM OF ALL RESPONSES TO PHQ QUESTIONS 1-9: 0
2. FEELING DOWN, DEPRESSED OR HOPELESS: 0

## 2022-11-17 NOTE — PATIENT INSTRUCTIONS
Fast for 9 hours with only water of black coffee before getting fasting blood work drawn. You can go to any [facility] or draw site for this, or can take paper orders with you to an external draw site. Well Visit, Ages 25 to 72: Care Instructions  Well visits can help you stay healthy. Your doctor has checked your overall health and may have suggested ways to take good care of yourself. Your doctor also may have recommended tests. You can help prevent illness with healthy eating, good sleep, vaccinations, regular exercise, and other steps. Get the tests that you and your doctor decide on. Depending on your age and risks, examples might include screening for diabetes; hepatitis C; HIV; and cervical, breast, lung, and colon cancer. Screening helps find diseases before any symptoms appear. Eat healthy foods. Choose fruits, vegetables, whole grains, lean protein, and low-fat dairy foods. Limit saturated fat and reduce salt. Limit alcohol. Men should have no more than 2 drinks a day. Women should have no more than 1. For some people, no alcohol is the best choice. Exercise. Get at least 30 minutes of exercise on most days of the week. Walking can be a good choice. Reach and stay at your healthy weight. This will lower your risk for many health problems. Take care of your mental health. Try to stay connected with friends, family, and community, and find ways to manage stress. If you're feeling depressed or hopeless, talk to someone. A counselor can help. If you don't have a counselor, talk to your doctor. Talk to your doctor if you think you may have a problem with alcohol or drug use. This includes prescription medicines and illegal drugs. Avoid tobacco and nicotine: Don't smoke, vape, or chew. If you need help quitting, talk to your doctor. Practice safer sex. Getting tested, using condoms or dental dams, and limiting sex partners can help prevent STIs.   Use birth control if it's important to you to prevent pregnancy. Talk with your doctor about your choices and what might be best for you. Prevent problems where you can. Protect your skin from too much sun, wash your hands, brush your teeth twice a day, and wear a seat belt in the car. Where can you learn more? Go to https://chpepicdedra.healthStranzz beauty supply. org and sign in to your [employer] account. Enter P072 in the [facility] box to learn more about \"Well Visit, Ages 25 to 72: Care Instructions. \"     If you do not have an account, please click on the \"Sign Up Now\" link. Current as of: March 9, 2022               Content Version: 13.4  © 2006-2022 [employer]. Care instructions adapted under license by [facility] ([facility]). If you have questions about a medical condition or this instruction, always ask your healthcare professional. [place] [address] any warranty or liability for your use of this information. A Healthy Lifestyle: Care Instructions  A healthy lifestyle can help you feel good, have more energy, and stay at a weight that's healthy for you. You can share a healthy lifestyle with your friends and family. And you can do it on your own. Eat meals with your friends or family. You could try cooking together. Plan activities with other people. Go for a walk with a friend, try a free online fitness class, or join a sports league. Eat a variety of healthy foods. These include fruits, vegetables, whole grains, low-fat dairy, and lean protein. Choose healthy portions of food. You can use the Nutrition Facts label on food packages as a guide. Eat more fruits and vegetables. You could add vegetables to sandwiches or add fruit to cereal.  Drink water when you are thirsty. Limit soda, juice, and sports drinks. Try to exercise most days. Aim for at least 2½ hours of exercise each week. Keep moving. Work in the garden or take your dog on a walk. Use the stairs instead of the elevator.   If you use tobacco or nicotine, try to quit. Ask your doctor about programs and medicines to help you quit. Limit alcohol. Men should have no more than 2 drinks a day. Women should have no more than 1. For some people, no alcohol is the best choice. Follow-up care is a key part of your treatment and safety. Be sure to make and go to all appointments, and call your doctor if you are having problems. It's also a good idea to know your test results and keep a list of the medicines you take. Where can you learn more? Go to https://iWOPIpeFastgen.[employer]. org and sign in to your [employer] account. Enter G334 in the [employer] box to learn more about \"A Healthy Lifestyle: Care Instructions. \"     If you do not have an account, please click on the \"Sign Up Now\" link. Current as of: March 9, 2022               Content Version: 13.4  © 8909-3085 Healthwise[employer]orporated. Care instructions adapted under license by [facility] ([facility]). If you have questions about a medical condition or this instruction, always ask your healthcare professional. [address] any warranty or liability for your use of this information.

## 2022-11-17 NOTE — PROGRESS NOTES
Well Adult Note  Name: Constance Trem[place]aleau Date: 2022   MRN: [id number] Sex: Male   Age: 33 y.o. Ethnicity: Non- / Non    : 1995 Race: White (non-)      Samantha Arana is here for well adult exam.  History:  Nerissa Mcelroy presents today to establish care. He went to urgent care last week for a rash and burning pain on his left hip. He was diagnosed with shingles and was prescribed valtrex for a month. He reports the rash does burning and itching, and hurts more when moving at work. He is a . Shingles was blistering, but since have popped and scabbed over. Post shingles pain has resolved. He did not take any meds for pain. He has not been to a doctor since before covid. He actually had COVID for short period of time, did okay with this. He has some seasonal allergies a flareup throughout the year, and he takes Allegra D for this. Usually he takes Allegra-D for no more than a month at a time. He has been taking Allegra-D the past few days as he was moving a parking garage, and a lot of dust got in his nose. He has rare insomnia, and takes melatonin approximately once a month. He had elevated triglycerides in the past, and he decreased the levels with exercise. He is not working out since covid interrupted his routine. He is not fasting today. Review of Systems   Constitutional:  Negative for chills and fever. HENT:  Negative for ear discharge, ear pain, hearing loss, sinus pressure, sinus pain and sore throat. Eyes:  Negative for pain, discharge and redness. Respiratory:  Negative for cough, shortness of breath and wheezing. Cardiovascular:  Negative for chest pain and palpitations. Gastrointestinal:  Negative for abdominal distention, abdominal pain, diarrhea, nausea and vomiting. Genitourinary:  Negative for dysuria and hematuria. Musculoskeletal:  Negative for myalgias. Skin:  Positive for rash (shingles left hip). Neurological:  Negative for weakness, numbness and headaches. Psychiatric/Behavioral:  Negative for dysphoric mood and sleep disturbance. The patient is not nervous/anxious. No Known Allergies      Prior to Visit Medications    Medication Sig Taking? Authorizing Provider   valACYclovir (VALTREX) 1 g tablet TAKE 1 TABLET BY MOUTH 3 TIMES PER DAY Yes Historical Provider, MD   Fexofenadine-Pseudoephedrine (ALLEGRA-D 24 HOUR PO) Take by mouth daily as needed Yes Historical Provider, MD       History reviewed. No pertinent past medical history. Past Surgical History:   Procedure Laterality Date    SHOULDER ARTHROSCOPY Left 11/17/2017    left shoulder scope, posterior labral repair / capsulorraphy    SHOULDER ARTHROSCOPY W/ LABRAL REPAIR Left          Family History   Problem Relation Age of Onset    Hypertension Father     Elevated Lipids Father     Diabetes type 2  Maternal Grandmother     Heart Disease Maternal Grandfather     Hypertension Paternal Grandfather     Pancreatic Cancer Paternal Grandfather        Social History     Tobacco Use    Smoking status: Never    Smokeless tobacco: Never   Vaping Use    Vaping Use: Never used   Substance Use Topics    Alcohol use: Yes     Comment: occasionally    Drug use: No       Objective   /84 (Site: Right Upper Arm, Position: Sitting, Cuff Size: Large Adult)   Pulse 84   Ht 5' 9\" (1.753 m)   Wt 236 lb (107 kg)   SpO2 99%   BMI 34.85 kg/m²   Wt Readings from Last 3 Encounters:   11/17/22 236 lb (107 kg)   06/07/22 249 lb 6.4 oz (113.1 kg)   05/27/22 225 lb (102.1 kg)     There were no vitals filed for this visit. Physical Exam  Vitals and nursing note reviewed. Constitutional:       General: He is not in acute distress. Appearance: Normal appearance. He is obese. HENT:      Head: Normocephalic and atraumatic.       Right Ear: Tympanic membrane, ear canal and external ear normal.      Left Ear: Tympanic membrane, ear canal and external ear normal.      Nose: Nose normal.      Mouth/Throat:      Mouth: Mucous membranes are moist.      Pharynx: Oropharynx is clear. No posterior oropharyngeal erythema. Eyes:      General: No scleral icterus. Right eye: No discharge. Left eye: No discharge. Extraocular Movements: Extraocular movements intact. Pupils: Pupils are equal, round, and reactive to light. Neck:      Vascular: No carotid bruit. Cardiovascular:      Rate and Rhythm: Normal rate and regular rhythm. Pulses: Normal pulses. Heart sounds: Normal heart sounds. No murmur heard. No gallop. Pulmonary:      Effort: Pulmonary effort is normal. No respiratory distress. Breath sounds: Normal breath sounds. No wheezing. Chest:      Chest wall: No tenderness. Abdominal:      General: Bowel sounds are normal.      Palpations: Abdomen is soft. Tenderness: There is no abdominal tenderness. There is no guarding or rebound. Musculoskeletal:         General: No tenderness. Normal range of motion. Cervical back: Normal range of motion and neck supple. Right lower leg: No edema. Left lower leg: No edema. Lymphadenopathy:      Cervical: No cervical adenopathy. Skin:     General: Skin is warm and dry. Capillary Refill: Capillary refill takes less than 2 seconds. Coloration: Skin is not jaundiced. Findings: Rash (scabbed over vesicular rash to one dermatome on left hip) present. Neurological:      Mental Status: He is alert and oriented to person, place, and time. Mental status is at baseline. Psychiatric:         Mood and Affect: Mood normal.         Behavior: Behavior normal.         Thought Content: Thought content normal.         Judgment: Judgment normal.         Assessment   Plan   1. Encounter for well adult exam without abnormal findings  -Personal medical history, surgical history, family history reviewed. Medications reconciled. -Care gaps addressed.   Declined screenings and vaccinations today.  -Educated on office policies and procedures.  -Follow-up in 1 year for annual physical with fasting blood work. 2. Seasonal allergic rhinitis, unspecified trigger  -Controlled with Allegra-D as needed. Discussed consequences of Allegra-D, including hypertension due to the pseudoephedrine aspect. Patient verbalized understanding, agrees to take Allegra instead.   -Also recommending Flonase when flares occur. Patient verbalized understanding.   -Follow-up as needed. 3. Herpes zoster without complication  -Currently being treated with Valtrex. Symptoms improving.  -No changes to current plan. -Follow-up as needed. 4. Screening for diabetes mellitus  -     Comprehensive Metabolic Panel; Future  5. Screening cholesterol level  -     Lipid, Fasting; Future       Personalized Preventive Plan   Current Health Maintenance Status  Immunization History   Administered Date(s) Administered    Tdap (Boostrix, Adacel) 11/08/2019        Health Maintenance   Topic Date Due    COVID-19 Vaccine (1) Never done    HPV vaccine (1 - Male 2-dose series) Never done    HIV screen  Never done    Hepatitis C screen  Never done    Flu vaccine (1) Never done    Depression Screen  11/17/2023    DTaP/Tdap/Td vaccine (2 - Td or Tdap) 11/08/2029    Hepatitis A vaccine  Aged Out    Hib vaccine  Aged Out    Meningococcal (ACWY) vaccine  Aged Out    Pneumococcal 0-64 years Vaccine  Aged Out    Varicella vaccine  Discontinued     Recommendations for Preventive Services Due: see orders and patient instructions/AVS.    Return in about 1 year (around 11/17/2023) for Physical, Fasting Labs. --BRYSON Rico - CNP      Please note that this chart was generated using dragon dictation software. Although every effort was made to ensure the accuracy of this automated transcription, some errors in transcription may have occurred.

## 2024-01-18 ENCOUNTER — OFFICE VISIT (OUTPATIENT)
Dept: FAMILY MEDICINE CLINIC | Age: 29
End: 2024-01-18
Payer: COMMERCIAL

## 2024-01-18 VITALS
SYSTOLIC BLOOD PRESSURE: 130 MMHG | BODY MASS INDEX: 34.79 KG/M2 | WEIGHT: 243 LBS | OXYGEN SATURATION: 97 % | HEART RATE: 87 BPM | HEIGHT: 70 IN | DIASTOLIC BLOOD PRESSURE: 68 MMHG

## 2024-01-18 DIAGNOSIS — S43.432S TEAR OF LEFT GLENOID LABRUM, SEQUELA: ICD-10-CM

## 2024-01-18 DIAGNOSIS — Z13.220 SCREENING CHOLESTEROL LEVEL: ICD-10-CM

## 2024-01-18 DIAGNOSIS — J30.2 SEASONAL ALLERGIC RHINITIS, UNSPECIFIED TRIGGER: ICD-10-CM

## 2024-01-18 DIAGNOSIS — Z00.00 ENCOUNTER FOR WELL ADULT EXAM WITHOUT ABNORMAL FINDINGS: Primary | ICD-10-CM

## 2024-01-18 DIAGNOSIS — Z13.1 SCREENING FOR DIABETES MELLITUS: ICD-10-CM

## 2024-01-18 PROCEDURE — 99395 PREV VISIT EST AGE 18-39: CPT

## 2024-01-18 SDOH — ECONOMIC STABILITY: INCOME INSECURITY: HOW HARD IS IT FOR YOU TO PAY FOR THE VERY BASICS LIKE FOOD, HOUSING, MEDICAL CARE, AND HEATING?: NOT HARD AT ALL

## 2024-01-18 SDOH — ECONOMIC STABILITY: FOOD INSECURITY: WITHIN THE PAST 12 MONTHS, THE FOOD YOU BOUGHT JUST DIDN'T LAST AND YOU DIDN'T HAVE MONEY TO GET MORE.: NEVER TRUE

## 2024-01-18 SDOH — ECONOMIC STABILITY: FOOD INSECURITY: WITHIN THE PAST 12 MONTHS, YOU WORRIED THAT YOUR FOOD WOULD RUN OUT BEFORE YOU GOT MONEY TO BUY MORE.: NEVER TRUE

## 2024-01-18 SDOH — ECONOMIC STABILITY: HOUSING INSECURITY
IN THE LAST 12 MONTHS, WAS THERE A TIME WHEN YOU DID NOT HAVE A STEADY PLACE TO SLEEP OR SLEPT IN A SHELTER (INCLUDING NOW)?: NO

## 2024-01-18 ASSESSMENT — ENCOUNTER SYMPTOMS
COLOR CHANGE: 0
CHEST TIGHTNESS: 0
ABDOMINAL DISTENTION: 0
VOMITING: 0
COUGH: 0
SORE THROAT: 0
DIARRHEA: 0
SHORTNESS OF BREATH: 0
ABDOMINAL PAIN: 0
NAUSEA: 0
RHINORRHEA: 0
CONSTIPATION: 0
BACK PAIN: 0
PHOTOPHOBIA: 0
WHEEZING: 0
TROUBLE SWALLOWING: 0

## 2024-01-18 ASSESSMENT — PATIENT HEALTH QUESTIONNAIRE - PHQ9
1. LITTLE INTEREST OR PLEASURE IN DOING THINGS: 0
SUM OF ALL RESPONSES TO PHQ QUESTIONS 1-9: 0
SUM OF ALL RESPONSES TO PHQ QUESTIONS 1-9: 0
SUM OF ALL RESPONSES TO PHQ9 QUESTIONS 1 & 2: 0
2. FEELING DOWN, DEPRESSED OR HOPELESS: 0
SUM OF ALL RESPONSES TO PHQ QUESTIONS 1-9: 0
SUM OF ALL RESPONSES TO PHQ QUESTIONS 1-9: 0

## 2024-01-18 NOTE — PATIENT INSTRUCTIONS
Version: 13.9  © 3014-6920 [employer].   Care instructions adapted under license by [employer]. If you have questions about a medical condition or this instruction, always ask your healthcare professional. [employer] disclaims any warranty or liability for your use of this information.           Well Visit, Ages 18 to 65: Care Instructions  Well visits can help you stay healthy. Your doctor has checked your overall health and may have suggested ways to take good care of yourself. Your doctor also may have recommended tests. You can help prevent illness with healthy eating, good sleep, vaccinations, regular exercise, and other steps.    Get the tests that you and your doctor decide on. Depending on your age and risks, examples might include screening for diabetes; hepatitis C; HIV; and cervical, breast, lung, and colon cancer. Screening helps find diseases before any symptoms appear.   Eat healthy foods. Choose fruits, vegetables, whole grains, lean protein, and low-fat dairy foods. Limit saturated fat and reduce salt.     Limit alcohol. Men should have no more than 2 drinks a day. Women should have no more than 1. For some people, no alcohol is the best choice.   Exercise. Get at least 30 minutes of exercise on most days of the week. Walking can be a good choice.     Reach and stay at your healthy weight. This will lower your risk for many health problems.   Take care of your mental health. Try to stay connected with friends, family, and community, and find ways to manage stress.     If you're feeling depressed or hopeless, talk to someone. A counselor can help. If you don't have a counselor, talk to your doctor.   Talk to your doctor if you think you may have a problem with alcohol or drug use. This includes prescription medicines, marijuana, and other drugs.     Avoid tobacco and nicotine: Don't smoke, vape, or chew. If you need help quitting, talk to your doctor.   Practice safer

## 2024-01-18 NOTE — PROGRESS NOTES
Services Due: see orders and patient instructions/AVS.    Return in about 1 year (around 1/18/2025) for Physical, Fasting Labs.      --Luke A Glischinski, BRYSON - CNP      Please note that this chart was generated using dragon dictation software.  Although every effort was made to ensure the accuracy of this automated transcription, some errors in transcription may have occurred.

## 2024-01-22 ENCOUNTER — NURSE ONLY (OUTPATIENT)
Dept: FAMILY MEDICINE CLINIC | Age: 29
End: 2024-01-22
Payer: COMMERCIAL

## 2024-01-22 DIAGNOSIS — Z13.1 SCREENING FOR DIABETES MELLITUS: ICD-10-CM

## 2024-01-22 DIAGNOSIS — Z13.220 SCREENING CHOLESTEROL LEVEL: ICD-10-CM

## 2024-01-22 PROCEDURE — 36415 COLL VENOUS BLD VENIPUNCTURE: CPT

## 2024-01-23 LAB
ALBUMIN SERPL-MCNC: 4.8 G/DL (ref 3.4–5)
ALBUMIN/GLOB SERPL: 1.8 {RATIO} (ref 1.1–2.2)
ALP SERPL-CCNC: 55 U/L (ref 40–129)
ALT SERPL-CCNC: 17 U/L (ref 10–40)
ANION GAP SERPL CALCULATED.3IONS-SCNC: 12 MMOL/L (ref 3–16)
AST SERPL-CCNC: 17 U/L (ref 15–37)
BILIRUB SERPL-MCNC: 0.3 MG/DL (ref 0–1)
BUN SERPL-MCNC: 18 MG/DL (ref 7–20)
CALCIUM SERPL-MCNC: 9.7 MG/DL (ref 8.3–10.6)
CHLORIDE SERPL-SCNC: 103 MMOL/L (ref 99–110)
CHOLEST SERPL-MCNC: 182 MG/DL (ref 0–199)
CO2 SERPL-SCNC: 25 MMOL/L (ref 21–32)
CREAT SERPL-MCNC: 1.1 MG/DL (ref 0.9–1.3)
GFR SERPLBLD CREATININE-BSD FMLA CKD-EPI: >60 ML/MIN/{1.73_M2}
GLUCOSE SERPL-MCNC: 83 MG/DL (ref 70–99)
HDLC SERPL-MCNC: 33 MG/DL (ref 40–60)
LDLC SERPL CALC-MCNC: 127 MG/DL
POTASSIUM SERPL-SCNC: 4.7 MMOL/L (ref 3.5–5.1)
PROT SERPL-MCNC: 7.5 G/DL (ref 6.4–8.2)
SODIUM SERPL-SCNC: 140 MMOL/L (ref 136–145)
TRIGL SERPL-MCNC: 110 MG/DL (ref 0–150)
VLDLC SERPL CALC-MCNC: 22 MG/DL

## 2024-06-07 NOTE — PATIENT INSTRUCTIONS

## 2024-06-10 ENCOUNTER — OFFICE VISIT (OUTPATIENT)
Dept: FAMILY MEDICINE CLINIC | Age: 29
End: 2024-06-10
Payer: COMMERCIAL

## 2024-06-10 VITALS
BODY MASS INDEX: 35.36 KG/M2 | HEIGHT: 70 IN | OXYGEN SATURATION: 98 % | SYSTOLIC BLOOD PRESSURE: 126 MMHG | HEART RATE: 52 BPM | DIASTOLIC BLOOD PRESSURE: 82 MMHG | WEIGHT: 247 LBS

## 2024-06-10 DIAGNOSIS — L73.9 FOLLICULITIS: Primary | ICD-10-CM

## 2024-06-10 PROCEDURE — 99213 OFFICE O/P EST LOW 20 MIN: CPT

## 2024-06-10 ASSESSMENT — ENCOUNTER SYMPTOMS
COLOR CHANGE: 0
BACK PAIN: 0
RHINORRHEA: 0
EYE PAIN: 0
SHORTNESS OF BREATH: 0
VOMITING: 0
DIARRHEA: 0
SORE THROAT: 0
NAUSEA: 0
EYE ITCHING: 0
PHOTOPHOBIA: 0
CHEST TIGHTNESS: 0
WHEEZING: 0
COUGH: 0

## 2024-06-10 NOTE — PROGRESS NOTES
Dustin Valentino (:  1995) is a 29 y.o. male,Established patient, here for evaluation of the following chief complaint(s):  Mass (Lump in left armpit x 1 week )      Assessment & Plan   1. Folliculitis  -Presentation consistent with folliculitis of the left armpit.  -Treatment options discussed.  Mupirocin is being sent to the pharmacy.   The medication uses and side effects were discussed with the patient.  Patient verbalized understanding and agrees to the plan.  -Can use warm compresses, can continue to use peroxide.  -Recommend avoiding picking, squeezing.  -Follow-up if no improvement.  -     mupirocin (BACTROBAN) 2 % ointment; Apply topically 3 times daily., Disp-1 each, R-1, Normal      Return if symptoms worsen or fail to improve.       Subjective   HPI  Last week he found a bump near his left armpit, felt the bump and stopped putting on deodorant. It maybe has gotten smaller. He has had an ingrown hair in his armpit before, other armpit. Allergies have been tough this year, cut his grass recently. No pain, no skin changes, he has not noticed a head. No fever or chills. No bumps anywhere else. He has used some peroxide wipes, scratching at it. No discharge.       Review of Systems   Constitutional:  Negative for chills, diaphoresis, fatigue and fever.   HENT:  Negative for congestion, postnasal drip, rhinorrhea and sore throat.    Eyes:  Negative for photophobia, pain, itching and visual disturbance.   Respiratory:  Negative for cough, chest tightness, shortness of breath and wheezing.    Cardiovascular:  Negative for chest pain and palpitations.   Gastrointestinal:  Negative for diarrhea, nausea and vomiting.   Endocrine: Negative for cold intolerance and heat intolerance.   Musculoskeletal:  Negative for arthralgias, back pain, myalgias and neck stiffness.   Skin:  Negative for color change, pallor, rash and wound.        Bump left armpit   Allergic/Immunologic: Positive for environmental

## 2025-01-20 ENCOUNTER — OFFICE VISIT (OUTPATIENT)
Dept: FAMILY MEDICINE CLINIC | Age: 30
End: 2025-01-20
Payer: COMMERCIAL

## 2025-01-20 VITALS
WEIGHT: 240.4 LBS | OXYGEN SATURATION: 98 % | DIASTOLIC BLOOD PRESSURE: 88 MMHG | SYSTOLIC BLOOD PRESSURE: 130 MMHG | HEART RATE: 68 BPM | BODY MASS INDEX: 34.41 KG/M2 | HEIGHT: 70 IN

## 2025-01-20 DIAGNOSIS — Z13.220 SCREENING CHOLESTEROL LEVEL: ICD-10-CM

## 2025-01-20 DIAGNOSIS — Z83.49 FAMILY HISTORY OF THYROID DISEASE: ICD-10-CM

## 2025-01-20 DIAGNOSIS — Z11.4 SCREENING FOR HIV (HUMAN IMMUNODEFICIENCY VIRUS): ICD-10-CM

## 2025-01-20 DIAGNOSIS — Z13.21 ENCOUNTER FOR VITAMIN DEFICIENCY SCREENING: ICD-10-CM

## 2025-01-20 DIAGNOSIS — Z13.1 SCREENING FOR DIABETES MELLITUS: ICD-10-CM

## 2025-01-20 DIAGNOSIS — B02.9 HERPES ZOSTER WITHOUT COMPLICATION: ICD-10-CM

## 2025-01-20 DIAGNOSIS — Z13.0 SCREENING FOR DEFICIENCY ANEMIA: ICD-10-CM

## 2025-01-20 DIAGNOSIS — Z00.00 ENCOUNTER FOR WELL ADULT EXAM WITHOUT ABNORMAL FINDINGS: Primary | ICD-10-CM

## 2025-01-20 DIAGNOSIS — Z11.59 NEED FOR HEPATITIS C SCREENING TEST: ICD-10-CM

## 2025-01-20 PROCEDURE — 99395 PREV VISIT EST AGE 18-39: CPT

## 2025-01-20 PROCEDURE — 36415 COLL VENOUS BLD VENIPUNCTURE: CPT

## 2025-01-20 SDOH — ECONOMIC STABILITY: FOOD INSECURITY: WITHIN THE PAST 12 MONTHS, THE FOOD YOU BOUGHT JUST DIDN'T LAST AND YOU DIDN'T HAVE MONEY TO GET MORE.: NEVER TRUE

## 2025-01-20 SDOH — ECONOMIC STABILITY: FOOD INSECURITY: WITHIN THE PAST 12 MONTHS, YOU WORRIED THAT YOUR FOOD WOULD RUN OUT BEFORE YOU GOT MONEY TO BUY MORE.: NEVER TRUE

## 2025-01-20 ASSESSMENT — ENCOUNTER SYMPTOMS
PHOTOPHOBIA: 0
SHORTNESS OF BREATH: 0
DIARRHEA: 0
CONSTIPATION: 0
SORE THROAT: 0
BACK PAIN: 0
COLOR CHANGE: 0
ABDOMINAL PAIN: 0
COUGH: 0
TROUBLE SWALLOWING: 0
ABDOMINAL DISTENTION: 0
NAUSEA: 0
WHEEZING: 0
RHINORRHEA: 0
CHEST TIGHTNESS: 0

## 2025-01-20 ASSESSMENT — PATIENT HEALTH QUESTIONNAIRE - PHQ9
SUM OF ALL RESPONSES TO PHQ9 QUESTIONS 1 & 2: 0
SUM OF ALL RESPONSES TO PHQ QUESTIONS 1-9: 0
SUM OF ALL RESPONSES TO PHQ9 QUESTIONS 1 & 2: 0
SUM OF ALL RESPONSES TO PHQ QUESTIONS 1-9: 0
1. LITTLE INTEREST OR PLEASURE IN DOING THINGS: NOT AT ALL
2. FEELING DOWN, DEPRESSED OR HOPELESS: NOT AT ALL
2. FEELING DOWN, DEPRESSED OR HOPELESS: NOT AT ALL
SUM OF ALL RESPONSES TO PHQ QUESTIONS 1-9: 0
SUM OF ALL RESPONSES TO PHQ QUESTIONS 1-9: 0
1. LITTLE INTEREST OR PLEASURE IN DOING THINGS: NOT AT ALL

## 2025-01-20 NOTE — PATIENT INSTRUCTIONS
To avoid our call center, call our office number [phone], and listen to the options.  After listening to all English options, select the behavioral health option (option 1).  This should get you to our front office.    Health goals: Weight loss goal of 5-10% of your current body weight with 1 pound of weight loss per week; drink at least 64 ounces of water a day, exercise at least 150 minutes/week, walk more than 7,000 steps daily, sleep between 7 to 9 hours nightly, consume approximately 2,000 calories daily, consume all calories within a 10-hour window daily, limit fatty, fried, and ultra-processed foods in the diet, and limit toxins in the diet (alcohol, drugs, smoking).    You may receive a survey regarding the care you received during your visit.  Your input is valuable to us.  We encourage you to complete and return your survey. We hope you will choose us in the future for your healthcare needs.        Well Visit, Ages 18 to 65: Care Instructions  Well visits can help you stay healthy. Your doctor has checked your overall health and may have suggested ways to take good care of yourself. Your doctor also may have recommended tests. You can help prevent illness with healthy eating, good sleep, vaccinations, regular exercise, and other steps.    Get the tests that you and your doctor decide on. Depending on your age and risks, examples might include screening for diabetes; hepatitis C; HIV; and cervical, breast, lung, and colon cancer. Screening helps find diseases before any symptoms appear.   Eat healthy foods. Choose fruits, vegetables, whole grains, lean protein, and low-fat dairy foods. Limit saturated fat and reduce salt.     Limit alcohol. Men should have no more than 2 drinks a day. Women should have no more than 1. For some people, no alcohol is the best choice.   Exercise. Get at least 30 minutes of exercise on most days of the week. Walking can be a good choice.     Reach and stay at your

## 2025-01-20 NOTE — ASSESSMENT & PLAN NOTE
-Declines history of cold sores, lesions genitalia  -Assess HSV IgG, follow-up based on results    Orders:    Herpes Simplex Virus, I, IgG; Future

## 2025-01-20 NOTE — PROGRESS NOTES
Well Adult Note  Name: Dustin Valentino Today’s Date: 2025   MRN: [id number] Sex: Male   Age: 30 y.o. Ethnicity: Non- / Non    : 1995 Race: White (non-)      Dustin Valentino is here for a well adult exam.       Assessment & Plan   Assessment & Plan  Encounter for well adult exam without abnormal findings   -Personal medical history, surgical history, family history reviewed.  Medications reconciled.  -Care gaps addressed.  Agreeable to screenings, declines vaccinations today.  -Age-appropriate healthcare topics discussed.  -Health goals: Weight loss goal of 5-10% of your current body weight with 1 pound of weight loss per week; drink at least 64 ounces of water a day, exercise at least 150 minutes/week, walk more than 7,000 steps daily, sleep between 7 to 9 hours nightly, consume approximately 2,000 calories daily, consume all calories within a 10-hour window daily, limit fatty, fried, and ultra-processed foods in the diet, and limit toxins in the diet (alcohol, drugs, smoking).  -Educated on office policies and procedures.  -Follow-up in 12 months for physical with fasting blood work     Family history of thyroid disease       Orders:    TSH; Future    T4, Free; Future    Herpes zoster without complication   -Declines history of cold sores, lesions genitalia  -Assess HSV IgG, follow-up based on results    Orders:    Herpes Simplex Virus, I, IgG; Future    Screening cholesterol level       Orders:    Lipid Panel; Future    Screening for diabetes mellitus       Orders:    Comprehensive Metabolic Panel; Future    Hemoglobin A1C; Future    Screening for deficiency anemia       Orders:    CBC with Auto Differential; Future    Need for hepatitis C screening test       Orders:    Hepatitis C Antibody; Future    Screening for HIV (human immunodeficiency virus)       Orders:    HIV Screen; Future    Encounter for vitamin deficiency screening       Orders:    Vitamin D 25 Hydroxy;

## 2025-01-21 LAB
25(OH)D3 SERPL-MCNC: 16.6 NG/ML
ALBUMIN SERPL-MCNC: 5 G/DL (ref 3.4–5)
ALBUMIN/GLOB SERPL: 1.6 {RATIO} (ref 1.1–2.2)
ALP SERPL-CCNC: 62 U/L (ref 40–129)
ALT SERPL-CCNC: 23 U/L (ref 10–40)
ANION GAP SERPL CALCULATED.3IONS-SCNC: 12 MMOL/L (ref 3–16)
AST SERPL-CCNC: 18 U/L (ref 15–37)
BASOPHILS # BLD: 0.1 K/UL (ref 0–0.2)
BASOPHILS NFR BLD: 0.9 %
BILIRUB SERPL-MCNC: 0.4 MG/DL (ref 0–1)
BUN SERPL-MCNC: 11 MG/DL (ref 7–20)
CALCIUM SERPL-MCNC: 10.2 MG/DL (ref 8.3–10.6)
CHLORIDE SERPL-SCNC: 100 MMOL/L (ref 99–110)
CHOLEST SERPL-MCNC: 208 MG/DL (ref 0–199)
CO2 SERPL-SCNC: 27 MMOL/L (ref 21–32)
CREAT SERPL-MCNC: 1.1 MG/DL (ref 0.9–1.3)
DEPRECATED RDW RBC AUTO: 13.6 % (ref 12.4–15.4)
EOSINOPHIL # BLD: 0.1 K/UL (ref 0–0.6)
EOSINOPHIL NFR BLD: 1.1 %
EST. AVERAGE GLUCOSE BLD GHB EST-MCNC: 102.5 MG/DL
GFR SERPLBLD CREATININE-BSD FMLA CKD-EPI: >90 ML/MIN/{1.73_M2}
GLUCOSE SERPL-MCNC: 82 MG/DL (ref 70–99)
HBA1C MFR BLD: 5.2 %
HCT VFR BLD AUTO: 46.1 % (ref 40.5–52.5)
HCV AB SERPL QL IA: NORMAL
HDLC SERPL-MCNC: 32 MG/DL (ref 40–60)
HERPES SIMPLEX VIRUS 1 IGG: NEGATIVE
HGB BLD-MCNC: 15.9 G/DL (ref 13.5–17.5)
HIV 1+2 AB+HIV1 P24 AG SERPL QL IA: NORMAL
HIV 2 AB SERPL QL IA: NORMAL
HIV1 AB SERPL QL IA: NORMAL
HIV1 P24 AG SERPL QL IA: NORMAL
LDLC SERPL CALC-MCNC: 147 MG/DL
LYMPHOCYTES # BLD: 2.1 K/UL (ref 1–5.1)
LYMPHOCYTES NFR BLD: 22 %
MCH RBC QN AUTO: 28.7 PG (ref 26–34)
MCHC RBC AUTO-ENTMCNC: 34.4 G/DL (ref 31–36)
MCV RBC AUTO: 83.5 FL (ref 80–100)
MONOCYTES # BLD: 0.5 K/UL (ref 0–1.3)
MONOCYTES NFR BLD: 5.5 %
NEUTROPHILS # BLD: 6.6 K/UL (ref 1.7–7.7)
NEUTROPHILS NFR BLD: 70.5 %
PLATELET # BLD AUTO: 334 K/UL (ref 135–450)
PMV BLD AUTO: 8.6 FL (ref 5–10.5)
POTASSIUM SERPL-SCNC: 5.1 MMOL/L (ref 3.5–5.1)
PROT SERPL-MCNC: 8.1 G/DL (ref 6.4–8.2)
RBC # BLD AUTO: 5.53 M/UL (ref 4.2–5.9)
SODIUM SERPL-SCNC: 139 MMOL/L (ref 136–145)
T4 FREE SERPL-MCNC: 1.3 NG/DL (ref 0.9–1.8)
TRIGL SERPL-MCNC: 143 MG/DL (ref 0–150)
TSH SERPL DL<=0.005 MIU/L-ACNC: 1.26 UIU/ML (ref 0.27–4.2)
VLDLC SERPL CALC-MCNC: 29 MG/DL
WBC # BLD AUTO: 9.4 K/UL (ref 4–11)